# Patient Record
Sex: FEMALE | Race: WHITE | HISPANIC OR LATINO | URBAN - METROPOLITAN AREA
[De-identification: names, ages, dates, MRNs, and addresses within clinical notes are randomized per-mention and may not be internally consistent; named-entity substitution may affect disease eponyms.]

---

## 2018-08-22 NOTE — PATIENT DISCUSSION
has had three episodes total OS and two episodes in the past week (previous two episodes were less significant).  most likely a blood flow issue producing transient visual changes but nothing in retina showing emboli at this time.  being eval by cardio today (Dr Isela Spaulding).  no symptoms of GCA upon questioning today.  may check echo, carotids, etc.  Could see retina here to eval with FA and check circulation OS vs OD.

## 2018-08-31 NOTE — PATIENT DISCUSSION
The patient's findings are compatible with epiretinal membrane with macular pucker. Macular pucker is usually due to previous posterior vitreous detachment 80% usually remain stable but Intervention is typically surgical. No treatment recommended at this point until # 1 stable.

## 2018-08-31 NOTE — PATIENT DISCUSSION
Pt stated that after his appointment with [facility] he saw Dr. Ze Erickson (cardiologist). Had a complete cardiac work-up. Pt states everything was WNL and his Carotid U/S had normal blockage for some one his age. Recommend FA/FP today to evaluate blood flow. R & B discussed in detail, pt elects to proceed.

## 2018-08-31 NOTE — PATIENT DISCUSSION
FA/FP WNL, no leakage or occlusion seen on FA/FP. Recommend patient have a more extensive VF 30-2 and will follow up with patient same day after VF performed. Advised pt to call with any vision changes.

## 2018-10-02 NOTE — PATIENT DISCUSSION
The IOP is in the target range. The patient will continue the current management and follow with [address] [place] [address].

## 2018-10-02 NOTE — PATIENT DISCUSSION
Pt edu VF showed nasal vision loss which is reason for veil in vision. No occulsion or edema seen on exam or OCT today, retina is stable. Would recommend Consult with [facility] next available.

## 2018-10-22 NOTE — PATIENT DISCUSSION
Pt edu VF showed nasal vision loss which is reason for veil in vision. No occulsion or edema seen on exam or OCT today, retina is stable. Would recommend Consult with [facility] next available.

## 2018-10-22 NOTE — PATIENT DISCUSSION
needs better iop control worsening of fields, options given of adding a drop or SLT R&B  of each reviewed. Pt chooses SLT will treat OS today then +/- SLT OD.

## 2018-10-22 NOTE — PROCEDURE NOTE: CLINICAL
PROCEDURE NOTE: SLT OS. Diagnosis: POAG, Moderate. Prior to treatment, risks/benefits/alternatives discussed including infection, loss of vision, hemorrhage, cataract, glaucoma, retinal tears or detachment. Lens:  SLT laser lens with goniosol. Power: 1.2mJ. Total applications: 858. Application 097 degrees. Patient tolerated procedure well. There were no complications. Post-op instructions given. Post-op IOP = 14 mmHg. Faviola Dean

## 2018-11-28 NOTE — PROCEDURE NOTE: CLINICAL
PROCEDURE NOTE: SLT OD. Diagnosis: POAG, Mild. Anesthesia: Topical. Prep: Alphagan 0.15%. Prior to treatment, risks/benefits/alternatives discussed including infection, loss of vision, hemorrhage, cataract, glaucoma, retinal tears or detachment. Lens:  SLT laser lens with goniosol. Power: 1.2mJ. Total applications: 94. Application 929 degrees. Patient tolerated procedure well. There were no complications. Post-op instructions given. Post-op IOP = 12 mmHg. Carolin Purcell

## 2018-11-28 NOTE — PATIENT DISCUSSION
Pt edu VF showed nasal vision loss which is reason for veil in vision. No occulsion or edema seen on exam or OCT today, retina is stable. Would recommend Consult with [facility] next available.

## 2019-02-12 NOTE — PATIENT DISCUSSION
this note from fall 2018. .. Dominique Mg has had three episodes total OS and two episodes in the past week (previous two episodes were less significant).  most likely a blood flow issue producing transient visual changes but nothing in retina showing emboli at this time.  being eval by cardio today (Dr Radha Spaulding).  no symptoms of GCA upon questioning today.  may check echo, carotids, etc.  Could see retina here to eval with FA and check circulation OS vs OD.

## 2019-04-02 NOTE — PATIENT DISCUSSION
Pt edu stable from last exam. 80% usually remain stable and never require treatment. Not visually significant. Recommend following up in 1 year. Call with any vision changes.

## 2019-04-26 ENCOUNTER — IMPORTED ENCOUNTER (OUTPATIENT)
Dept: URBAN - METROPOLITAN AREA CLINIC 31 | Facility: CLINIC | Age: 24
End: 2019-04-26

## 2019-04-26 PROBLEM — H16.213: Noted: 2019-04-26

## 2019-04-26 PROCEDURE — 92012 INTRM OPH EXAM EST PATIENT: CPT

## 2019-04-26 NOTE — PATIENT DISCUSSION
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  F/u 1 week

## 2019-08-14 NOTE — PATIENT DISCUSSION
8/14/19 VF today shows inf bitemp (change from past).  Will re-test in near future and check color plates if repeatable will order MRI.

## 2019-09-11 NOTE — PATIENT DISCUSSION
9/11/19 VF better today and still may just be PPA.  watch in 6 months if progression/changes will order MRI.  color not convincing.

## 2019-09-18 NOTE — PATIENT DISCUSSION
not definitively ocular in nature.  ed may be visual-motor integration issues but dizziness is very complicated.  Pt awaiting results of CT and blood work from Dr Sharron Garcia I will send letter to PCP today.

## 2020-06-24 NOTE — PATIENT DISCUSSION
not definitively ocular in nature.  ed may be visual-motor integration issues but dizziness is very complicated.  Pt awaiting results of CT and blood work from Dr Adonis Yung I will send letter to PCP today.

## 2020-06-24 NOTE — PATIENT DISCUSSION
6/24/2020:  single episode yesterday.  VF and exam normal.  ed may have been a transient change in blood flow.  note to PCP consider CDU and echo possible GANNON with cardio.  negative for symptoms of GCA upon questioning today.

## 2020-11-06 NOTE — PATIENT DISCUSSION
not definitively ocular in nature.  ed may be visual-motor integration issues but dizziness is very complicated.  Pt awaiting results of CT and blood work from Dr Rani Mae I will send letter to PCP today.

## 2021-05-18 NOTE — PROCEDURE NOTE: CLINICAL
PROCEDURE NOTE: Removal of Conjunctival FB, Embedded #1 OS. Anesthesia: Topical. Prior to treatment, the risks/benefits/alternatives were discussed. The patient wished to proceed with procedure. Embedded conjunctival FB removed with forcep/needle. Globe and conjunctiva intact. Patient tolerated procedure well. There were no complications. Post procedure instructions given. Cate Weiner

## 2021-05-18 NOTE — PATIENT DISCUSSION
5/18/2021:. HVF stable and IOP acceptable based on ONHs at this time.  CPM and watch for RNFL/HVF changes over time.

## 2021-05-18 NOTE — PATIENT DISCUSSION
not definitively ocular in nature.  ed may be visual-motor integration issues but dizziness is very complicated.  Pt awaiting results of CT and blood work from Dr Cricket Klinefelter I will send letter to PCP today.

## 2021-06-08 NOTE — PATIENT DISCUSSION
not definitively ocular in nature.  ed may be visual-motor integration issues but dizziness is very complicated.  Pt awaiting results of CT and blood work from Dr Siobhan Fleming I will send letter to PCP today.

## 2021-06-08 NOTE — PATIENT DISCUSSION
At this time been stable for years, can monitor with Optometry and prn with retina---Pt. wishes to see retina yearly.

## 2021-06-08 NOTE — PATIENT DISCUSSION
ALL BELOW PER [facility] NOTES----5/18/2021:. HVF stable and IOP acceptable based on ONHs at this time.  CPM and watch for RNFL/HVF changes over time.

## 2021-06-08 NOTE — PATIENT DISCUSSION
per pt's question last [facility] refraction showed potential improvement with correction.  Pt can consider RX sunglasses or driving glasses.

## 2021-11-18 NOTE — PATIENT DISCUSSION
not definitively ocular in nature.  ed may be visual-motor integration issues but dizziness is very complicated.  Pt awaiting results of CT and blood work from Dr Clyde Davies I will send letter to PCP today.

## 2021-11-24 NOTE — PATIENT DISCUSSION
not definitively ocular in nature.  ed may be visual-motor integration issues but dizziness is very complicated.  Pt awaiting results of CT and blood work from Dr Josey Ruiz I will send letter to PCP today.

## 2021-12-20 NOTE — PATIENT DISCUSSION
Start Tobradex drops TID x 1 week, begin Maxitol ointment BID to eyelid, and into eye QHS x 2 weeks.

## 2021-12-20 NOTE — PATIENT DISCUSSION
not definitively ocular in nature.  ed may be visual-motor integration issues but dizziness is very complicated.  Pt awaiting results of CT and blood work from Dr Mattie Fabry I will send letter to PCP today.

## 2021-12-20 NOTE — PROCEDURE NOTE: CLINICAL
PROCEDURE NOTE: Excision Chalazion, Multiple, Same Lid Right Lower Lid. Diagnosis: Chalazion. Anesthesia: Subconjunctival Lidocaine. Prep: Betadine Scrub. Prior to treatment, the risks/benefits/alternatives were discussed. The patient wished to proceed with procedure. Local anesthesia was applied and the lid was prepped. The lid was clamped exposing the posterior surface on the eyelid. Each chalazion was incised and removed with a curette. Bleeding was controlled and the clamp was removed. Patient tolerated procedure well. There were no complications. Post procedure instructions given. Dominique Mg

## 2022-05-18 NOTE — PATIENT DISCUSSION
not definitively ocular in nature.  ed may be visual-motor integration issues but dizziness is very complicated.  Pt awaiting results of CT and blood work from Dr Yazmin Desai I will send letter to PCP today.

## 2022-11-30 NOTE — PATIENT DISCUSSION
not definitively ocular in nature.  ed may be visual-motor integration issues but dizziness is very complicated.  Pt awaiting results of CT and blood work from Dr Mynor Shelley I will send letter to PCP today.

## 2022-11-30 NOTE — PATIENT DISCUSSION
Recommended observation vs consult with TB for PPV evaluation.  pt wishes to see TB for Sx evaluation.  OS bothers him the most.

## 2022-11-30 NOTE — PATIENT DISCUSSION
11/30/2022: stable disc IOP and RNFL.   watch for changes on HVF.  if changes will repeat SLT none since fall 2018.

## 2023-01-25 NOTE — PATIENT DISCUSSION
not definitively ocular in nature.  ed may be visual-motor integration issues but dizziness is very complicated.  Pt awaiting results of CT and blood work from Dr Jae Ceballos I will send letter to PCP today.

## 2023-04-17 ENCOUNTER — APPOINTMENT (RX ONLY)
Dept: URBAN - METROPOLITAN AREA CLINIC 333 | Facility: CLINIC | Age: 28
Setting detail: DERMATOLOGY
End: 2023-04-17

## 2023-04-17 DIAGNOSIS — L21.8 OTHER SEBORRHEIC DERMATITIS: ICD-10-CM | Status: INADEQUATELY CONTROLLED

## 2023-04-17 DIAGNOSIS — L71.8 OTHER ROSACEA: ICD-10-CM | Status: INADEQUATELY CONTROLLED

## 2023-04-17 PROBLEM — D23.61 OTHER BENIGN NEOPLASM OF SKIN OF RIGHT UPPER LIMB, INCLUDING SHOULDER: Status: ACTIVE | Noted: 2023-04-17

## 2023-04-17 PROBLEM — D23.62 OTHER BENIGN NEOPLASM OF SKIN OF LEFT UPPER LIMB, INCLUDING SHOULDER: Status: ACTIVE | Noted: 2023-04-17

## 2023-04-17 PROCEDURE — ? COUNSELING

## 2023-04-17 PROCEDURE — ? IN-HOUSE DISPENSING PHARMACY

## 2023-04-17 PROCEDURE — ? PRESCRIPTION MEDICATION MANAGEMENT

## 2023-04-17 PROCEDURE — ? PRESCRIPTION

## 2023-04-17 PROCEDURE — 99204 OFFICE O/P NEW MOD 45 MIN: CPT

## 2023-04-17 RX ORDER — KETOCONAZOLE 20 MG/ML
1 SHAMPOO, SUSPENSION TOPICAL TIW
Qty: 120 | Refills: 3 | Status: ERX | COMMUNITY
Start: 2023-04-17

## 2023-04-17 RX ORDER — CLOBETASOL PROPIONATE 0.5 MG/ML
1 SOLUTION TOPICAL TIW
Qty: 50 | Refills: 6 | Status: ERX | COMMUNITY
Start: 2023-04-17

## 2023-04-17 RX ADMIN — KETOCONAZOLE 1: 20 SHAMPOO, SUSPENSION TOPICAL at 00:00

## 2023-04-17 RX ADMIN — CLOBETASOL PROPIONATE 1: 0.5 SOLUTION TOPICAL at 00:00

## 2023-04-17 ASSESSMENT — LOCATION DETAILED DESCRIPTION DERM
LOCATION DETAILED: RIGHT NASAL ALAR GROOVE
LOCATION DETAILED: POSTERIOR MID-PARIETAL SCALP
LOCATION DETAILED: RIGHT SCAPHA
LOCATION DETAILED: RIGHT INFERIOR CENTRAL MALAR CHEEK
LOCATION DETAILED: LEFT INFERIOR CENTRAL MALAR CHEEK
LOCATION DETAILED: LEFT SCAPHA
LOCATION DETAILED: LEFT NASAL ALA

## 2023-04-17 ASSESSMENT — LOCATION SIMPLE DESCRIPTION DERM
LOCATION SIMPLE: POSTERIOR SCALP
LOCATION SIMPLE: LEFT CHEEK
LOCATION SIMPLE: RIGHT NOSE
LOCATION SIMPLE: RIGHT CHEEK
LOCATION SIMPLE: RIGHT EAR
LOCATION SIMPLE: LEFT NOSE
LOCATION SIMPLE: LEFT EAR

## 2023-04-17 ASSESSMENT — LOCATION ZONE DERM
LOCATION ZONE: EAR
LOCATION ZONE: NOSE
LOCATION ZONE: FACE
LOCATION ZONE: SCALP

## 2023-04-17 NOTE — PROCEDURE: PRESCRIPTION MEDICATION MANAGEMENT
Detail Level: Zone
Render In Strict Bullet Format?: No
Continue Regimen: Metronidazole cream once a day. \\n\\nHydrocortisone 2.5 on affected areas three times a week as needed for flare ups.
Initiate Treatment: Differin. Exfoliate areas.
Initiate Treatment: Ketoconazole shampoo three times a week. \\n\\nClobetasol solution three times a week. \\n\\n#10 fungal derm cream dispensary. Once a day for a week then three times a week.
Modify Regimen: Recommended product Hydrafix shampoo and conditioner.

## 2023-04-17 NOTE — HPI: RASH (ROSACEA)
Is This A New Presentation, Or A Follow-Up?: Follow Up Rosacea
Additional History: Please also check possible seb derm in scalp. Present for a year. Patient tried Over the counter medicated shampoo but no improvement. Patient can?t recall name. \\n\\nPlease also check darkening in both axillae

## 2023-04-17 NOTE — PROCEDURE: IN-HOUSE DISPENSING PHARMACY
Product 10 Units Dispensed: 1
Product 39 Price/Unit (In Dollars): 0
Product 80 Unit Type: mg
Product 14 Application Directions: apply to AA QD
Name Of Product 10: Fungal Dermatitis Cream
Name Of Product 1: Rosacea silicone #14
Render Refills If Set To 0: Yes
Name Of Product 14: Rosacea Silicone Gel
Product 13 Unit Type: grams
Product 13 Refills: 6
Product 13 Price/Unit (In Dollars): 45
Product 10 Application Directions: Apply to affected areas of the face once a day for a week then down to three times a week.
Product 14 Amount/Unit (Numbers Only): 30
Product 10 Price/Unit (In Dollars): 35
Detail Level: Zone
Name Of Product 13: Rosacea Cream- Azelaic Acid
Product 10 Refills: 3

## 2023-05-01 ENCOUNTER — APPOINTMENT (RX ONLY)
Dept: URBAN - METROPOLITAN AREA CLINIC 334 | Facility: CLINIC | Age: 28
Setting detail: DERMATOLOGY
End: 2023-05-01

## 2023-05-01 DIAGNOSIS — Z02.9 ENCOUNTER FOR ADMINISTRATIVE EXAMINATIONS, UNSPECIFIED: ICD-10-CM

## 2023-09-11 ENCOUNTER — RX ONLY (OUTPATIENT)
Age: 28
Setting detail: RX ONLY
End: 2023-09-11

## 2023-09-11 RX ORDER — KETOCONAZOLE/HYDROCORTISONE 2 %-2.5 %
AS DIRECTED CREAM (GRAM) TOPICAL AS DIRECTED
Qty: 30 | Refills: 5 | Status: ERX | COMMUNITY
Start: 2023-09-11

## 2023-10-19 ENCOUNTER — APPOINTMENT (RX ONLY)
Dept: URBAN - METROPOLITAN AREA CLINIC 333 | Facility: CLINIC | Age: 28
Setting detail: DERMATOLOGY
End: 2023-10-19

## 2023-10-19 DIAGNOSIS — L74.51 PRIMARY FOCAL HYPERHIDROSIS: ICD-10-CM | Status: INADEQUATELY CONTROLLED

## 2023-10-19 DIAGNOSIS — L71.8 OTHER ROSACEA: ICD-10-CM

## 2023-10-19 DIAGNOSIS — L21.8 OTHER SEBORRHEIC DERMATITIS: ICD-10-CM | Status: STABLE

## 2023-10-19 PROBLEM — D23.62 OTHER BENIGN NEOPLASM OF SKIN OF LEFT UPPER LIMB, INCLUDING SHOULDER: Status: ACTIVE | Noted: 2023-10-19

## 2023-10-19 PROBLEM — L74.512 PRIMARY FOCAL HYPERHIDROSIS, PALMS: Status: ACTIVE | Noted: 2023-10-19

## 2023-10-19 PROBLEM — D23.61 OTHER BENIGN NEOPLASM OF SKIN OF RIGHT UPPER LIMB, INCLUDING SHOULDER: Status: ACTIVE | Noted: 2023-10-19

## 2023-10-19 PROBLEM — L74.513 PRIMARY FOCAL HYPERHIDROSIS, SOLES: Status: ACTIVE | Noted: 2023-10-19

## 2023-10-19 PROCEDURE — 99214 OFFICE O/P EST MOD 30 MIN: CPT

## 2023-10-19 PROCEDURE — ? COUNSELING

## 2023-10-19 PROCEDURE — ? PRESCRIPTION

## 2023-10-19 PROCEDURE — ? PRESCRIPTION MEDICATION MANAGEMENT

## 2023-10-19 RX ORDER — ADAPALENE 3 MG/G
1 GEL TOPICAL QHS
Qty: 45 | Refills: 4 | Status: ERX | COMMUNITY
Start: 2023-10-19

## 2023-10-19 RX ORDER — KETOCONAZOLE 20 MG/ML
1 SHAMPOO, SUSPENSION TOPICAL BIW
Qty: 240 | Refills: 5 | Status: ERX

## 2023-10-19 RX ORDER — OXYBUTYNIN CHLORIDE 5 MG/1
TABLET ORAL AS DIRECTED
Qty: 60 | Refills: 2 | Status: ERX | COMMUNITY
Start: 2023-10-19

## 2023-10-19 RX ORDER — CLOBETASOL PROPIONATE 0.5 MG/ML
1 SOLUTION TOPICAL BIW
Qty: 50 | Refills: 6 | Status: ERX

## 2023-10-19 RX ADMIN — ADAPALENE 1: 3 GEL TOPICAL at 00:00

## 2023-10-19 RX ADMIN — OXYBUTYNIN CHLORIDE: 5 TABLET ORAL at 00:00

## 2023-10-19 ASSESSMENT — LOCATION ZONE DERM
LOCATION ZONE: FACE
LOCATION ZONE: HAND
LOCATION ZONE: EAR
LOCATION ZONE: NOSE
LOCATION ZONE: SCALP
LOCATION ZONE: FEET

## 2023-10-19 ASSESSMENT — LOCATION DETAILED DESCRIPTION DERM
LOCATION DETAILED: POSTERIOR MID-PARIETAL SCALP
LOCATION DETAILED: LEFT ULNAR PALM
LOCATION DETAILED: RIGHT MEDIAL PLANTAR MIDFOOT
LOCATION DETAILED: RIGHT NASAL ALAR GROOVE
LOCATION DETAILED: LEFT SCAPHA
LOCATION DETAILED: RIGHT INFERIOR CENTRAL MALAR CHEEK
LOCATION DETAILED: RIGHT SCAPHA
LOCATION DETAILED: LEFT NASAL ALA
LOCATION DETAILED: RIGHT RADIAL PALM
LOCATION DETAILED: LEFT INFERIOR CENTRAL MALAR CHEEK
LOCATION DETAILED: LEFT MEDIAL PLANTAR MIDFOOT

## 2023-10-19 ASSESSMENT — LOCATION SIMPLE DESCRIPTION DERM
LOCATION SIMPLE: LEFT CHEEK
LOCATION SIMPLE: LEFT NOSE
LOCATION SIMPLE: RIGHT PLANTAR SURFACE
LOCATION SIMPLE: RIGHT NOSE
LOCATION SIMPLE: RIGHT EAR
LOCATION SIMPLE: POSTERIOR SCALP
LOCATION SIMPLE: LEFT HAND
LOCATION SIMPLE: LEFT PLANTAR SURFACE
LOCATION SIMPLE: LEFT EAR
LOCATION SIMPLE: RIGHT HAND
LOCATION SIMPLE: RIGHT CHEEK

## 2023-10-19 NOTE — PROCEDURE: PRESCRIPTION MEDICATION MANAGEMENT
Modify Regimen: Recommended product Hydrafix shampoo and conditioner.
Continue Regimen: Ketoconazole shampoo three times a week. \\n\\nClobetasol solution three times a week. \\n\\n#10 fungal derm cream dispensary. Once a day for a week then three times a week.
Render In Strict Bullet Format?: No
Detail Level: Zone
Continue Regimen: Metronidazole cream once a day. \\n\\nHydrocortisone 2.5 on affected areas three times a week as needed for flare ups.
Initiate Treatment: Oxybutynin. Take half a pill once a day for 1 week. Then take half a pill twice a day for 2 weeks. Then take one whole pill twice a day.
Initiate Treatment: Adapelene gel. Apply a small, thin amount to both axilla start with every other night. If tolerated, can move to every night.

## 2023-10-19 NOTE — HPI: SWEATING (HYPERHIDROSIS)
Sweating Severity Scale: 3- The sweating is barely tolerable and frequently interferes with daily activities
Is This A New Presentation, Or A Follow-Up?: Follow Up Hyperhidrosis
Additional History: \\n\\nLast discussion 12/30/2019 in [facility].

## 2023-11-16 ENCOUNTER — APPOINTMENT (RX ONLY)
Dept: URBAN - METROPOLITAN AREA CLINIC 333 | Facility: CLINIC | Age: 28
Setting detail: DERMATOLOGY
End: 2023-11-16

## 2023-11-16 DIAGNOSIS — L24.9 IRRITANT CONTACT DERMATITIS, UNSPECIFIED CAUSE: ICD-10-CM | Status: INADEQUATELY CONTROLLED

## 2023-11-16 PROCEDURE — ? PRESCRIPTION MEDICATION MANAGEMENT

## 2023-11-16 PROCEDURE — ? PRESCRIPTION

## 2023-11-16 PROCEDURE — 99213 OFFICE O/P EST LOW 20 MIN: CPT

## 2023-11-16 PROCEDURE — ? COUNSELING

## 2023-11-16 RX ORDER — CLOBETASOL PROPIONATE 0.5 MG/G
1 CREAM TOPICAL BID
Qty: 45 | Refills: 3 | Status: ERX | COMMUNITY
Start: 2023-11-16

## 2023-11-16 RX ADMIN — CLOBETASOL PROPIONATE 1: 0.5 CREAM TOPICAL at 00:00

## 2023-11-16 ASSESSMENT — LOCATION DETAILED DESCRIPTION DERM
LOCATION DETAILED: LEFT LABIUM MAJUS
LOCATION DETAILED: RIGHT LABIUM MINUS

## 2023-11-16 ASSESSMENT — LOCATION SIMPLE DESCRIPTION DERM
LOCATION SIMPLE: LABIA MINORA
LOCATION SIMPLE: LABIA MAJORA

## 2023-11-16 ASSESSMENT — LOCATION ZONE DERM: LOCATION ZONE: VULVA

## 2023-11-16 NOTE — HPI: RASH
Is This A New Presentation, Or A Follow-Up?: Rash
Additional History: \\n\\nPatient was recently seen by her GYN and was diagnosed with vulvodynia.

## 2023-11-16 NOTE — PROCEDURE: PRESCRIPTION MEDICATION MANAGEMENT
Render In Strict Bullet Format?: No
Initiate Treatment: Clobetasol cream. Apply a small amount to affected areas of the groin twice a day for one week.
Detail Level: Zone

## 2024-03-11 ENCOUNTER — RX ONLY (OUTPATIENT)
Age: 29
Setting detail: RX ONLY
End: 2024-03-11

## 2024-03-11 RX ORDER — OXYBUTYNIN CHLORIDE 5 MG/1
TABLET ORAL AS DIRECTED
Qty: 60 | Refills: 5 | Status: ERX

## 2024-04-15 NOTE — PATIENT DISCUSSION
Lvm for patient. Explained that she has 6 refills of her BC so she needs to contact her pharmacy to call in her refill. She should have refills until next year. I told her that if they give her any trouble to give us a call back.    limits BCVA (notes with cross-covering).

## 2024-04-23 ENCOUNTER — APPOINTMENT (RX ONLY)
Dept: URBAN - METROPOLITAN AREA CLINIC 333 | Facility: CLINIC | Age: 29
Setting detail: DERMATOLOGY
End: 2024-04-23

## 2024-04-23 ENCOUNTER — RX ONLY (OUTPATIENT)
Age: 29
Setting detail: RX ONLY
End: 2024-04-23

## 2024-04-23 DIAGNOSIS — L74.51 PRIMARY FOCAL HYPERHIDROSIS: ICD-10-CM

## 2024-04-23 PROBLEM — L74.513 PRIMARY FOCAL HYPERHIDROSIS, SOLES: Status: ACTIVE | Noted: 2024-04-23

## 2024-04-23 PROBLEM — L74.512 PRIMARY FOCAL HYPERHIDROSIS, PALMS: Status: ACTIVE | Noted: 2024-04-23

## 2024-04-23 PROCEDURE — ? PRESCRIPTION MEDICATION MANAGEMENT

## 2024-04-23 PROCEDURE — ? COUNSELING

## 2024-04-23 PROCEDURE — 99213 OFFICE O/P EST LOW 20 MIN: CPT

## 2024-04-23 PROCEDURE — ? PRESCRIPTION

## 2024-04-23 RX ORDER — ADAPALENE 3 MG/G
1 GEL TOPICAL QHS
Qty: 45 | Refills: 4 | Status: ERX

## 2024-04-23 RX ORDER — OXYBUTYNIN CHLORIDE 5 MG/1
1 TABLET ORAL AS DIRECTED
Qty: 60 | Refills: 2 | Status: ERX

## 2024-04-23 ASSESSMENT — LOCATION SIMPLE DESCRIPTION DERM
LOCATION SIMPLE: RIGHT HAND
LOCATION SIMPLE: LEFT HAND
LOCATION SIMPLE: LEFT PLANTAR SURFACE
LOCATION SIMPLE: RIGHT PLANTAR SURFACE

## 2024-04-23 ASSESSMENT — LOCATION DETAILED DESCRIPTION DERM
LOCATION DETAILED: LEFT ULNAR PALM
LOCATION DETAILED: LEFT MEDIAL PLANTAR MIDFOOT
LOCATION DETAILED: RIGHT RADIAL PALM
LOCATION DETAILED: RIGHT MEDIAL PLANTAR MIDFOOT

## 2024-04-23 ASSESSMENT — LOCATION ZONE DERM
LOCATION ZONE: FEET
LOCATION ZONE: HAND

## 2024-04-23 NOTE — PROCEDURE: COUNSELING
Medical Necessity Clause: Botulinum toxin hyperhidrosis therapy can be medically necessary because
Patient Specific Counseling (Will Not Stick From Patient To Patient): ***\\nDiscussed the treatment, iontophoresis. Patient will continue Oxybutynin for 6-8 weeks then determine if any treatment will be necessary.
Medical Necessity Information: LCD Guidelines vary from payer to payer. Please check with your payer's policy to determine medical necessity.
Detail Level: Simple
English

## 2024-10-21 ENCOUNTER — APPOINTMENT (RX ONLY)
Dept: URBAN - METROPOLITAN AREA CLINIC 333 | Facility: CLINIC | Age: 29
Setting detail: DERMATOLOGY
End: 2024-10-21

## 2024-10-21 DIAGNOSIS — L24.9 IRRITANT CONTACT DERMATITIS, UNSPECIFIED CAUSE: ICD-10-CM

## 2024-10-21 DIAGNOSIS — L21.8 OTHER SEBORRHEIC DERMATITIS: ICD-10-CM

## 2024-10-21 PROBLEM — L30.9 DERMATITIS, UNSPECIFIED: Status: ACTIVE | Noted: 2024-10-21

## 2024-10-21 PROCEDURE — ? ORDER FOR PATCH TESTING

## 2024-10-21 PROCEDURE — ? PRESCRIPTION MEDICATION MANAGEMENT

## 2024-10-21 PROCEDURE — 99213 OFFICE O/P EST LOW 20 MIN: CPT

## 2024-10-21 PROCEDURE — ? COUNSELING

## 2024-10-21 PROCEDURE — ? PRESCRIPTION

## 2024-10-21 RX ORDER — TACROLIMUS 1 MG/G
1 OINTMENT TOPICAL TWICE A DAY
Qty: 60 | Refills: 4 | Status: ERX | COMMUNITY
Start: 2024-10-21

## 2024-10-21 RX ORDER — KETOCONAZOLE 20 MG/ML
1 SHAMPOO, SUSPENSION TOPICAL BIW
Qty: 240 | Refills: 8 | Status: ERX

## 2024-10-21 RX ADMIN — TACROLIMUS 1: 1 OINTMENT TOPICAL at 00:00

## 2024-10-21 ASSESSMENT — LOCATION SIMPLE DESCRIPTION DERM
LOCATION SIMPLE: UPPER BACK
LOCATION SIMPLE: LABIA MAJORA
LOCATION SIMPLE: POSTERIOR SCALP
LOCATION SIMPLE: LEFT EAR
LOCATION SIMPLE: RIGHT NOSE
LOCATION SIMPLE: LEFT NOSE
LOCATION SIMPLE: RIGHT EAR
LOCATION SIMPLE: LABIA MINORA

## 2024-10-21 ASSESSMENT — LOCATION DETAILED DESCRIPTION DERM
LOCATION DETAILED: INFERIOR THORACIC SPINE
LOCATION DETAILED: LEFT NASAL ALA
LOCATION DETAILED: LEFT SCAPHA
LOCATION DETAILED: RIGHT SCAPHA
LOCATION DETAILED: POSTERIOR MID-PARIETAL SCALP
LOCATION DETAILED: RIGHT LABIUM MINUS
LOCATION DETAILED: LEFT LABIUM MAJUS
LOCATION DETAILED: RIGHT NASAL ALAR GROOVE

## 2024-10-21 ASSESSMENT — LOCATION ZONE DERM
LOCATION ZONE: TRUNK
LOCATION ZONE: VULVA
LOCATION ZONE: EAR
LOCATION ZONE: NOSE
LOCATION ZONE: SCALP

## 2024-10-21 NOTE — PROCEDURE: PRESCRIPTION MEDICATION MANAGEMENT
Discontinue Regimen: Clobetasol cream
Plan: Will plan and start process for insurance to cover patch testing.
Render In Strict Bullet Format?: No
Detail Level: Zone
Modify Regimen: Recommended product Hydrafix shampoo and conditioner.
Continue Regimen: Ketoconazole shampoo three times a week. \\n\\nClobetasol solution three times a week. \\n\\n#10 fungal derm cream dispensary. Once a day for a week then three times a week.

## 2024-10-21 NOTE — PROCEDURE: ORDER FOR PATCH TESTING
Location Patches Should Be Applied: Back
Counseling: I discussed the timing of the procedure and ensured the patient understands that this test requires multiple visits. No topical steroids applied should be applied to the patch testing location and no oral prednisone for two week prior to the test. While the patches are in place they should be kept dry which will limit bathing, swimming an exercise. I also explained that it is common for testing to be negative and this doesn't mean there isn't a allergic reaction occurring. During the testing itching is common.
Patch Test Reading Schedule: First Reading at 48 hours and Second Reading at 72 hours
Patch Test To Be Applied: TRUE test
Detail Level: Generalized
Low Risk (score 7-11)

## 2024-10-22 ENCOUNTER — RX ONLY (OUTPATIENT)
Age: 29
Setting detail: RX ONLY
End: 2024-10-22

## 2024-10-22 RX ORDER — OXYBUTYNIN CHLORIDE 5 MG/1
TABLET ORAL
Qty: 60 | Refills: 6 | Status: ERX

## 2024-11-13 ENCOUNTER — RX ONLY (OUTPATIENT)
Age: 29
Setting detail: RX ONLY
End: 2024-11-13

## 2024-11-13 RX ORDER — TRIAMCINOLONE ACETONIDE 1 MG/G
CREAM TOPICAL
Qty: 80 | Refills: 0 | Status: ERX | COMMUNITY
Start: 2024-11-13

## 2024-11-18 ENCOUNTER — APPOINTMENT (RX ONLY)
Dept: URBAN - METROPOLITAN AREA CLINIC 333 | Facility: CLINIC | Age: 29
Setting detail: DERMATOLOGY
End: 2024-11-18

## 2024-11-18 DIAGNOSIS — L24.9 IRRITANT CONTACT DERMATITIS, UNSPECIFIED CAUSE: ICD-10-CM

## 2024-11-18 PROBLEM — L30.9 DERMATITIS, UNSPECIFIED: Status: ACTIVE | Noted: 2024-11-18

## 2024-11-18 PROCEDURE — ? PATCH TESTING

## 2024-11-18 PROCEDURE — ? COUNSELING

## 2024-11-18 PROCEDURE — 95044 PATCH/APPLICATION TESTS: CPT

## 2024-11-18 PROCEDURE — ? PRESCRIPTION MEDICATION MANAGEMENT

## 2024-11-18 ASSESSMENT — LOCATION ZONE DERM
LOCATION ZONE: VULVA
LOCATION ZONE: TRUNK

## 2024-11-18 ASSESSMENT — LOCATION DETAILED DESCRIPTION DERM
LOCATION DETAILED: INFERIOR THORACIC SPINE
LOCATION DETAILED: RIGHT LABIUM MINUS
LOCATION DETAILED: LEFT LABIUM MAJUS

## 2024-11-18 ASSESSMENT — LOCATION SIMPLE DESCRIPTION DERM
LOCATION SIMPLE: LABIA MINORA
LOCATION SIMPLE: LABIA MAJORA
LOCATION SIMPLE: UPPER BACK

## 2024-11-20 ENCOUNTER — APPOINTMENT (RX ONLY)
Dept: URBAN - METROPOLITAN AREA CLINIC 333 | Facility: CLINIC | Age: 29
Setting detail: DERMATOLOGY
End: 2024-11-20

## 2024-11-20 DIAGNOSIS — L24.9 IRRITANT CONTACT DERMATITIS, UNSPECIFIED CAUSE: ICD-10-CM

## 2024-11-20 PROBLEM — L30.9 DERMATITIS, UNSPECIFIED: Status: ACTIVE | Noted: 2024-11-20

## 2024-11-20 PROCEDURE — ? TRUE TEST READING

## 2024-11-20 PROCEDURE — ? COUNSELING

## 2024-11-20 PROCEDURE — 99212 OFFICE O/P EST SF 10 MIN: CPT

## 2024-11-20 ASSESSMENT — LOCATION SIMPLE DESCRIPTION DERM
LOCATION SIMPLE: UPPER BACK
LOCATION SIMPLE: LABIA MAJORA
LOCATION SIMPLE: LABIA MINORA

## 2024-11-20 ASSESSMENT — LOCATION DETAILED DESCRIPTION DERM
LOCATION DETAILED: LEFT LABIUM MAJUS
LOCATION DETAILED: RIGHT LABIUM MINUS
LOCATION DETAILED: INFERIOR THORACIC SPINE

## 2024-11-20 ASSESSMENT — LOCATION ZONE DERM
LOCATION ZONE: TRUNK
LOCATION ZONE: VULVA

## 2024-11-20 NOTE — PROCEDURE: TRUE TEST READING
12 - Cobalt Dichloride: no reaction
Number Of Patches Read: 3
Show Negative Results In The Note?: Yes
Show Allergen Counseling In The Note?: No
Detail Level: Zone
What Reading Time Point?: 48 hour

## 2024-11-21 ENCOUNTER — APPOINTMENT (RX ONLY)
Dept: URBAN - METROPOLITAN AREA CLINIC 333 | Facility: CLINIC | Age: 29
Setting detail: DERMATOLOGY
End: 2024-11-21

## 2024-11-21 ENCOUNTER — RX ONLY (OUTPATIENT)
Age: 29
Setting detail: RX ONLY
End: 2024-11-21

## 2024-11-21 DIAGNOSIS — L24.9 IRRITANT CONTACT DERMATITIS, UNSPECIFIED CAUSE: ICD-10-CM

## 2024-11-21 PROBLEM — L30.9 DERMATITIS, UNSPECIFIED: Status: ACTIVE | Noted: 2024-11-21

## 2024-11-21 PROCEDURE — ? COUNSELING

## 2024-11-21 PROCEDURE — ? TRUE TEST READING

## 2024-11-21 PROCEDURE — 99212 OFFICE O/P EST SF 10 MIN: CPT

## 2024-11-21 PROCEDURE — ? ADDITIONAL NOTES

## 2024-11-21 RX ORDER — PREDNISONE 20 MG/1
TABLET ORAL AS DIRECTED
Qty: 9 | Refills: 0 | COMMUNITY
Start: 2024-11-21

## 2024-11-21 ASSESSMENT — LOCATION ZONE DERM
LOCATION ZONE: VULVA
LOCATION ZONE: TRUNK

## 2024-11-21 ASSESSMENT — LOCATION DETAILED DESCRIPTION DERM
LOCATION DETAILED: RIGHT LABIUM MINUS
LOCATION DETAILED: LEFT LABIUM MAJUS
LOCATION DETAILED: INFERIOR THORACIC SPINE

## 2024-11-21 ASSESSMENT — LOCATION SIMPLE DESCRIPTION DERM
LOCATION SIMPLE: LABIA MINORA
LOCATION SIMPLE: LABIA MAJORA
LOCATION SIMPLE: UPPER BACK

## 2024-11-21 NOTE — PROCEDURE: ADDITIONAL NOTES
Render Risk Assessment In Note?: no
Detail Level: Simple
Additional Notes: Patient performed urine test yesterday with OBGYN: negative.\\n\\nWill try and send Tetrix cream to a specialty pharmacy. \\n\\nGave pt printed copy of prednisone prescription to fill acne surgery.

## 2024-11-21 NOTE — PROCEDURE: TRUE TEST READING
12 - Cobalt Dichloride: no reaction
28 - Gold Sodium Thiosulfate: 1+
Number Of Patches Read: 3
Show Negative Results In The Note?: Yes
Show Allergen Counseling In The Note?: No
Detail Level: Zone
Gold Sodium Thiosulfate Counseling: Gave information regarding slight reaction to spot.
What Reading Time Point?: 72 hour

## 2024-12-02 ENCOUNTER — RX ONLY (OUTPATIENT)
Age: 29
Setting detail: RX ONLY
End: 2024-12-02

## 2024-12-02 RX ORDER — EMOLLIENT COMBINATION NO.32
1 EMULSION, EXTENDED RELEASE TOPICAL TWICE A DAY
Qty: 90 | Refills: 4 | Status: ERX | COMMUNITY
Start: 2024-12-02

## 2025-01-06 ENCOUNTER — RX ONLY (RX ONLY)
Age: 30
End: 2025-01-06

## 2025-01-06 RX ORDER — PREDNISONE 20 MG/1
1 TABLET ORAL AS DIRECTED
Qty: 18 | Refills: 0 | Status: ERX

## 2025-03-13 ENCOUNTER — APPOINTMENT (OUTPATIENT)
Dept: URBAN - METROPOLITAN AREA CLINIC 333 | Facility: CLINIC | Age: 30
Setting detail: DERMATOLOGY
End: 2025-03-13

## 2025-03-13 DIAGNOSIS — L90.5 SCAR CONDITIONS AND FIBROSIS OF SKIN: ICD-10-CM

## 2025-03-13 DIAGNOSIS — L90.6 STRIAE ATROPHICAE: ICD-10-CM

## 2025-03-13 DIAGNOSIS — L71.0 PERIORAL DERMATITIS: ICD-10-CM | Status: INADEQUATELY CONTROLLED

## 2025-03-13 PROCEDURE — ? ADDITIONAL NOTES

## 2025-03-13 PROCEDURE — ? COUNSELING

## 2025-03-13 PROCEDURE — ? PRESCRIPTION

## 2025-03-13 PROCEDURE — 99213 OFFICE O/P EST LOW 20 MIN: CPT

## 2025-03-13 RX ORDER — HYDROCORTISONE 0.5 G/100G
AS DIRECTED CREAM TOPICAL AS DIRECTED
Qty: 28.4 | Refills: 1 | Status: ERX | COMMUNITY
Start: 2025-03-13

## 2025-03-13 RX ORDER — PHARMACY COMPOUNDING ACCESSORY
AS DIRECTED EACH MISCELLANEOUS AS DIRECTED
Qty: 30 | Refills: 4 | Status: ERX | COMMUNITY
Start: 2025-03-13

## 2025-03-13 RX ORDER — METRONIDAZOLE 7.5 MG/G
AS DIRECTED CREAM TOPICAL AS DIRECTED
Qty: 45 | Refills: 2 | Status: ERX | COMMUNITY
Start: 2025-03-13

## 2025-03-13 RX ADMIN — METRONIDAZOLE AS DIRECTED: 7.5 CREAM TOPICAL at 00:00

## 2025-03-13 RX ADMIN — HYDROCORTISONE AS DIRECTED: 0.5 CREAM TOPICAL at 00:00

## 2025-03-13 RX ADMIN — Medication AS DIRECTED: at 00:00

## 2025-03-13 ASSESSMENT — LOCATION DETAILED DESCRIPTION DERM
LOCATION DETAILED: LEFT RIB CAGE
LOCATION DETAILED: LEFT SUPRAPUBIC SKIN
LOCATION DETAILED: RIGHT RIB CAGE
LOCATION DETAILED: RIGHT MEDIAL MALAR CHEEK
LOCATION DETAILED: PERIUMBILICAL SKIN

## 2025-03-13 ASSESSMENT — LOCATION ZONE DERM
LOCATION ZONE: FACE
LOCATION ZONE: TRUNK

## 2025-03-13 ASSESSMENT — LOCATION SIMPLE DESCRIPTION DERM
LOCATION SIMPLE: ABDOMEN
LOCATION SIMPLE: GROIN
LOCATION SIMPLE: RIGHT CHEEK

## 2025-03-13 NOTE — HPI: RASH
How Severe Is Your Rash?: mild
Is This A New Presentation, Or A Follow-Up?: Rash
Additional History: Recommendations for enlarging pores\\nScars on breast and abdomen from a mommy make over.

## 2025-03-13 NOTE — PROCEDURE: ADDITIONAL NOTES
Render Risk Assessment In Note?: no
Additional Notes: Recommended Proscriptix firming retinol body lotion.
Detail Level: Simple

## 2025-03-14 RX ORDER — HYDROCORTISONE 0.5 G/100G
AS DIRECTED CREAM TOPICAL AS DIRECTED
Qty: 28.4 | Refills: 1 | Status: ERX

## 2025-03-24 RX ORDER — HYDROCORTISONE 0.5 G/100G
AS DIRECTED CREAM TOPICAL AS DIRECTED
Qty: 28.4 | Refills: 1 | Status: ERX

## 2025-06-26 ENCOUNTER — APPOINTMENT (OUTPATIENT)
Dept: URBAN - METROPOLITAN AREA CLINIC 333 | Facility: CLINIC | Age: 30
Setting detail: DERMATOLOGY
End: 2025-06-26

## 2025-06-26 DIAGNOSIS — L74.51 PRIMARY FOCAL HYPERHIDROSIS: ICD-10-CM

## 2025-06-26 DIAGNOSIS — L71.0 PERIORAL DERMATITIS: ICD-10-CM | Status: WELL CONTROLLED

## 2025-06-26 DIAGNOSIS — L21.8 OTHER SEBORRHEIC DERMATITIS: ICD-10-CM

## 2025-06-26 PROBLEM — L74.513 PRIMARY FOCAL HYPERHIDROSIS, SOLES: Status: ACTIVE | Noted: 2025-06-26

## 2025-06-26 PROBLEM — L74.512 PRIMARY FOCAL HYPERHIDROSIS, PALMS: Status: ACTIVE | Noted: 2025-06-26

## 2025-06-26 PROCEDURE — ? PRESCRIPTION

## 2025-06-26 PROCEDURE — ? PRESCRIPTION MEDICATION MANAGEMENT

## 2025-06-26 PROCEDURE — ? COUNSELING

## 2025-06-26 RX ORDER — KETOCONAZOLE 20 MG/ML
1 SHAMPOO, SUSPENSION TOPICAL BIW
Qty: 120 | Refills: 8 | Status: ERX

## 2025-06-26 ASSESSMENT — LOCATION SIMPLE DESCRIPTION DERM
LOCATION SIMPLE: LEFT HAND
LOCATION SIMPLE: RIGHT HAND
LOCATION SIMPLE: RIGHT PLANTAR SURFACE
LOCATION SIMPLE: LEFT PLANTAR SURFACE
LOCATION SIMPLE: LEFT EAR
LOCATION SIMPLE: RIGHT EAR
LOCATION SIMPLE: RIGHT NOSE
LOCATION SIMPLE: POSTERIOR SCALP
LOCATION SIMPLE: LEFT NOSE
LOCATION SIMPLE: RIGHT CHEEK

## 2025-06-26 ASSESSMENT — LOCATION DETAILED DESCRIPTION DERM
LOCATION DETAILED: RIGHT NASAL ALAR GROOVE
LOCATION DETAILED: RIGHT RADIAL PALM
LOCATION DETAILED: POSTERIOR MID-PARIETAL SCALP
LOCATION DETAILED: LEFT NASAL ALA
LOCATION DETAILED: LEFT ULNAR PALM
LOCATION DETAILED: RIGHT MEDIAL PLANTAR MIDFOOT
LOCATION DETAILED: RIGHT SCAPHA
LOCATION DETAILED: RIGHT MEDIAL MALAR CHEEK
LOCATION DETAILED: LEFT SCAPHA
LOCATION DETAILED: LEFT MEDIAL PLANTAR MIDFOOT

## 2025-06-26 ASSESSMENT — LOCATION ZONE DERM
LOCATION ZONE: FACE
LOCATION ZONE: NOSE
LOCATION ZONE: SCALP
LOCATION ZONE: HAND
LOCATION ZONE: EAR
LOCATION ZONE: FEET

## 2025-06-26 NOTE — PROCEDURE: PRESCRIPTION MEDICATION MANAGEMENT
Continue Regimen: Metrocream: apply to the face twice a day
Detail Level: Zone
Render In Strict Bullet Format?: No
Discontinue Regimen: Oxybuytin
Initiate Treatment: Qbrexza wipes: use to wipes hands once a day in the morning, samples were given to patient in office
Modify Regimen: Recommended product Hydrafix shampoo and conditioner.
Continue Regimen: Ketoconazole shampoo three times a week. \\n\\nClobetasol solution three times a week. \\n\\n#10 fungal derm cream dispensary. Once a day for a week then three times a week.

## 2025-08-19 ENCOUNTER — APPOINTMENT (OUTPATIENT)
Dept: URBAN - METROPOLITAN AREA CLINIC 333 | Facility: CLINIC | Age: 30
Setting detail: DERMATOLOGY
End: 2025-08-19

## 2025-08-19 DIAGNOSIS — L30.8 OTHER SPECIFIED DERMATITIS: ICD-10-CM | Status: INADEQUATELY CONTROLLED

## 2025-08-19 DIAGNOSIS — L21.8 OTHER SEBORRHEIC DERMATITIS: ICD-10-CM

## 2025-08-19 DIAGNOSIS — L74.51 PRIMARY FOCAL HYPERHIDROSIS: ICD-10-CM

## 2025-08-19 PROBLEM — L74.513 PRIMARY FOCAL HYPERHIDROSIS, SOLES: Status: ACTIVE | Noted: 2025-08-19

## 2025-08-19 PROBLEM — L74.512 PRIMARY FOCAL HYPERHIDROSIS, PALMS: Status: ACTIVE | Noted: 2025-08-19

## 2025-08-19 PROCEDURE — ? PRESCRIPTION

## 2025-08-19 PROCEDURE — ? PRESCRIPTION MEDICATION MANAGEMENT

## 2025-08-19 PROCEDURE — ? COUNSELING

## 2025-08-19 ASSESSMENT — LOCATION DETAILED DESCRIPTION DERM
LOCATION DETAILED: RIGHT SCAPHA
LOCATION DETAILED: RIGHT MEDIAL PLANTAR MIDFOOT
LOCATION DETAILED: POSTERIOR MID-PARIETAL SCALP
LOCATION DETAILED: LEFT THENAR EMINENCE
LOCATION DETAILED: LEFT SCAPHA
LOCATION DETAILED: RIGHT RADIAL PALM
LOCATION DETAILED: LEFT ULNAR PALM
LOCATION DETAILED: LEFT MEDIAL PLANTAR MIDFOOT
LOCATION DETAILED: LEFT NASAL ALA
LOCATION DETAILED: RIGHT NASAL ALAR GROOVE
LOCATION DETAILED: RIGHT ULNAR PALM

## 2025-08-19 ASSESSMENT — LOCATION ZONE DERM
LOCATION ZONE: EAR
LOCATION ZONE: SCALP
LOCATION ZONE: HAND
LOCATION ZONE: NOSE
LOCATION ZONE: FEET

## 2025-08-19 ASSESSMENT — LOCATION SIMPLE DESCRIPTION DERM
LOCATION SIMPLE: LEFT PLANTAR SURFACE
LOCATION SIMPLE: RIGHT PLANTAR SURFACE
LOCATION SIMPLE: RIGHT HAND
LOCATION SIMPLE: RIGHT NOSE
LOCATION SIMPLE: RIGHT EAR
LOCATION SIMPLE: LEFT NOSE
LOCATION SIMPLE: POSTERIOR SCALP
LOCATION SIMPLE: LEFT EAR
LOCATION SIMPLE: LEFT HAND